# Patient Record
Sex: MALE | URBAN - NONMETROPOLITAN AREA
[De-identification: names, ages, dates, MRNs, and addresses within clinical notes are randomized per-mention and may not be internally consistent; named-entity substitution may affect disease eponyms.]

---

## 2021-03-26 ENCOUNTER — NURSE TRIAGE (OUTPATIENT)
Dept: CALL CENTER | Facility: HOSPITAL | Age: 61
End: 2021-03-26

## 2021-03-27 NOTE — TELEPHONE ENCOUNTER
"    Reason for Disposition  • [1] MILD-MODERATE mouth pain AND [2] present > 3 days    Additional Information  • Negative: Severe difficulty breathing (e.g., struggling for each breath, speaks in single words, stridor)  • Negative: Sounds like a life-threatening emergency to the triager  • Negative: Followed a tooth (or teeth) injury  • Negative: Followed a mouth injury  • Negative: Throat is painful  • Negative: Canker sore suspected (i.e., aphthous ulcer) in the mouth  • Negative: Cold sore suspected (i.e., fever blister sore) on the outer lip  • Negative: Tooth is painful or swelling around a tooth  • Negative: [1] Drooling or spitting out saliva (because can't swallow) AND [2] new onset  • Negative: Electrical burn of mouth  • Negative: Chemical burn of mouth  • Negative: Tongue is very swollen and tender  • Negative: [1] Difficulty breathing AND [2] not severe  • Negative: [1] Face is swollen AND [2] fever  • Negative: [1] Drinking very little AND [2] dehydration suspected (e.g., no urine > 12 hours, very dry mouth, very lightheaded)  • Negative: Patient sounds very sick or weak to the triager  • Negative: [1] SEVERE mouth pain (e.g., excruciating) AND [2] not improved after 2 hours of pain medicine  • Negative: Face is very swollen  • Negative: Large lymph node (> 1 inch or 2.5 cm) under the jaw  • Negative: White patches that stick to tongue or inner cheek  • Negative: Diabetes mellitus or weak immune system (e.g., HIV positive, cancer chemo, splenectomy, organ transplant, chronic steroids)  • Negative: [1] Face is swollen AND [2] no fever  • Negative: Receiving chemotherapy or radiation therapy    Answer Assessment - Initial Assessment Questions  1. ONSET: \"When did the mouth start hurting?\" (e.g., hours or days ago)       He has no teeth, but having gum swelling and pain.  First had it about 5 days ago and it went away and now it is back.    2. SEVERITY: \"How bad is the pain?\" (Scale 1-10; mild, moderate or " "severe)    - MILD (1-3):  doesn't interfere with eating or normal activities    - MODERATE (4-7): interferes with eating some solids and normal activities    - SEVERE (8-10):  excruciating pain, interferes with most normal activities    - SEVERE DYSPHAGIA: can't swallow liquids, drooling      Moderate.    3. SORES: \"Are there any sores or ulcers in the mouth?\" If so, ask: \"What part of the mouth are the sores in?\"      No swelling.  Bottom right back of gum.    4. FEVER: \"Do you have a fever?\" If so, ask: \"What is your temperature, how was it measured, and when did it start?\"      No   5. CAUSE: \"What do you think is causing the mouth pain?\"      Unknown    6. OTHER SYMPTOMS: \"Do you have any other symptoms?\" (e.g., difficulty breathing)      No.    Protocols used: MOUTH PAIN-ADULT-AH      "

## 2022-07-09 ENCOUNTER — TELEPHONE ENCOUNTER (OUTPATIENT)
Dept: URBAN - METROPOLITAN AREA CLINIC 121 | Facility: CLINIC | Age: 62
End: 2022-07-09

## 2022-07-09 RX ORDER — ECHINACEA PURPUREA AERIAL 350 MG
CAPSULE ORAL
Refills: 0 | OUTPATIENT
Start: 2015-02-03 | End: 2015-11-10

## 2022-07-09 RX ORDER — PANTOPRAZOLE SODIUM 40 MG/1
TABLET, DELAYED RELEASE ORAL TWICE A DAY
Refills: 11 | OUTPATIENT
Start: 2013-12-11 | End: 2015-01-05

## 2022-07-09 RX ORDER — UBIDECARENONE/VIT E ACET 100MG-5
CAPSULE ORAL ONCE A DAY
Refills: 0 | OUTPATIENT
Start: 2015-02-03 | End: 2015-11-10

## 2022-07-09 RX ORDER — OMEPRAZOLE 20 MG/1
CAPSULE, DELAYED RELEASE ORAL ONCE A DAY
Refills: 11 | OUTPATIENT
Start: 2012-04-11 | End: 2012-07-13

## 2022-07-09 RX ORDER — PANTOPRAZOLE SODIUM 40 MG/1
TABLET, DELAYED RELEASE ORAL ONCE A DAY
Refills: 0 | OUTPATIENT
Start: 2012-04-12 | End: 2012-07-13

## 2022-07-09 RX ORDER — VALSARTAN 80 MG/1
TABLET, FILM COATED ORAL ONCE A DAY
Refills: 0 | OUTPATIENT
Start: 2015-11-10 | End: 2018-01-16

## 2022-07-09 RX ORDER — ECHINACEA PURPUREA AERIAL 350 MG
CAPSULE ORAL
Refills: 0 | OUTPATIENT
Start: 2015-11-10 | End: 2018-01-16

## 2022-07-09 RX ORDER — PANTOPRAZOLE SODIUM 40 MG/1
TABLET, DELAYED RELEASE ORAL ONCE A DAY
Refills: 11 | OUTPATIENT
Start: 2012-07-13 | End: 2013-07-08

## 2022-07-09 RX ORDER — PSYLLIUM HUSK 100 %
POWDER (GRAM) MISCELLANEOUS
Refills: 0 | OUTPATIENT
Start: 2015-02-03 | End: 2015-11-10

## 2022-07-09 RX ORDER — PSYLLIUM HUSK 100 %
POWDER (GRAM) MISCELLANEOUS
Refills: 0 | OUTPATIENT
Start: 2015-11-10 | End: 2018-01-16

## 2022-07-09 RX ORDER — PANTOPRAZOLE SODIUM 40 MG/1
TABLET, DELAYED RELEASE ORAL TWICE A DAY
Refills: 1 | OUTPATIENT
Start: 2015-01-05 | End: 2018-01-16

## 2022-07-09 RX ORDER — OMEPRAZOLE 20 MG/1
CAPSULE, DELAYED RELEASE ORAL ONCE A DAY
Refills: 11 | OUTPATIENT
Start: 2012-02-14 | End: 2012-02-14

## 2022-07-09 RX ORDER — PANTOPRAZOLE SODIUM 40 MG/1
TABLET, DELAYED RELEASE ORAL ONCE A DAY
Refills: 0 | OUTPATIENT
Start: 2012-04-11 | End: 2012-04-12

## 2022-07-09 RX ORDER — ESOMEPRAZOLE MAGNESIUM 40 MG
CAPSULE,DELAYED RELEASE (ENTERIC COATED) ORAL ONCE A DAY
Refills: 0 | OUTPATIENT
Start: 2012-02-14 | End: 2012-07-13

## 2022-07-09 RX ORDER — PANTOPRAZOLE SODIUM 40 MG/1
TABLET, DELAYED RELEASE ORAL TWICE A DAY
Refills: 11 | OUTPATIENT
Start: 2015-02-03 | End: 2016-03-02

## 2022-07-09 RX ORDER — UBIDECARENONE/VIT E ACET 100MG-5
CAPSULE ORAL ONCE A DAY
Refills: 0 | OUTPATIENT
Start: 2015-11-10 | End: 2018-01-16

## 2022-07-09 RX ORDER — ESOMEPRAZOLE MAGNESIUM 40 MG
CAPSULE,DELAYED RELEASE (ENTERIC COATED) ORAL TWICE A DAY
Refills: 0 | OUTPATIENT
Start: 2010-12-28 | End: 2012-02-14

## 2022-07-10 ENCOUNTER — TELEPHONE ENCOUNTER (OUTPATIENT)
Dept: URBAN - METROPOLITAN AREA CLINIC 121 | Facility: CLINIC | Age: 62
End: 2022-07-10

## 2022-07-10 RX ORDER — SILDENAFIL 20 MG/1
TABLET, FILM COATED ORAL AS NEEDED
Refills: 0 | Status: ACTIVE | COMMUNITY
Start: 2018-01-16

## 2022-07-10 RX ORDER — VALSARTAN 40 MG/1
TABLET ORAL TWICE A DAY
Refills: 0 | Status: ACTIVE | COMMUNITY
Start: 2018-01-16

## 2022-07-10 RX ORDER — PANTOPRAZOLE SODIUM 40 MG/1
TAKE ONE TABLET BY MOUTH DAILY TABLET, DELAYED RELEASE ORAL
Refills: 11 | Status: ACTIVE | COMMUNITY
Start: 2013-07-08

## 2022-07-10 RX ORDER — PSYLLIUM HUSK 100 %
POWDER (GRAM) MISCELLANEOUS ONCE A DAY
Refills: 0 | Status: ACTIVE | COMMUNITY
Start: 2018-01-16

## 2022-07-10 RX ORDER — PANTOPRAZOLE SODIUM 40 MG/1
TWICE A DAY TABLET, DELAYED RELEASE ORAL TWICE A DAY
Refills: 10 | Status: ACTIVE | COMMUNITY
Start: 2016-03-02

## 2022-12-01 ENCOUNTER — NEW PATIENT (OUTPATIENT)
Dept: URBAN - METROPOLITAN AREA CLINIC 26 | Facility: CLINIC | Age: 62
End: 2022-12-01

## 2022-12-01 VITALS
HEIGHT: 75 IN | BODY MASS INDEX: 25.49 KG/M2 | WEIGHT: 205 LBS | SYSTOLIC BLOOD PRESSURE: 140 MMHG | DIASTOLIC BLOOD PRESSURE: 88 MMHG | HEART RATE: 75 BPM

## 2022-12-01 PROCEDURE — 67145 PROPH RTA DTCHMNT PC: CPT

## 2022-12-01 PROCEDURE — 92250 FUNDUS PHOTOGRAPHY W/I&R: CPT

## 2022-12-01 PROCEDURE — 99204 OFFICE O/P NEW MOD 45 MIN: CPT

## 2022-12-01 ASSESSMENT — VISUAL ACUITY
OD_SC: 20/30
OS_SC: 20/20-1

## 2022-12-01 ASSESSMENT — TONOMETRY
OS_IOP_MMHG: 13
OD_IOP_MMHG: 12

## 2022-12-15 ENCOUNTER — FOLLOW UP (OUTPATIENT)
Dept: URBAN - METROPOLITAN AREA CLINIC 26 | Facility: CLINIC | Age: 62
End: 2022-12-15

## 2022-12-15 PROCEDURE — 92012 INTRM OPH EXAM EST PATIENT: CPT

## 2022-12-15 PROCEDURE — 92250 FUNDUS PHOTOGRAPHY W/I&R: CPT

## 2022-12-15 ASSESSMENT — TONOMETRY
OS_IOP_MMHG: 14
OD_IOP_MMHG: 10

## 2022-12-15 ASSESSMENT — VISUAL ACUITY
OD_SC: 20/25-1
OS_SC: 20/20

## 2023-05-18 ENCOUNTER — COMPREHENSIVE EXAM (OUTPATIENT)
Dept: URBAN - METROPOLITAN AREA CLINIC 26 | Facility: CLINIC | Age: 63
End: 2023-05-18

## 2023-05-18 VITALS — BODY MASS INDEX: 25.49 KG/M2 | WEIGHT: 205 LBS | HEIGHT: 75 IN

## 2023-05-18 DIAGNOSIS — H04.123: ICD-10-CM

## 2023-05-18 DIAGNOSIS — H43.11: ICD-10-CM

## 2023-05-18 DIAGNOSIS — H33.301: ICD-10-CM

## 2023-05-18 DIAGNOSIS — H43.813: ICD-10-CM

## 2023-05-18 PROCEDURE — 92134 CPTRZ OPH DX IMG PST SGM RTA: CPT

## 2023-05-18 PROCEDURE — 92250 FUNDUS PHOTOGRAPHY W/I&R: CPT

## 2023-05-18 PROCEDURE — 92014 COMPRE OPH EXAM EST PT 1/>: CPT

## 2023-05-18 ASSESSMENT — VISUAL ACUITY
OS_SC: 20/20-2
OD_SC: 20/20-1

## 2023-05-18 ASSESSMENT — TONOMETRY
OS_IOP_MMHG: 9
OD_IOP_MMHG: 8